# Patient Record
Sex: MALE | Race: WHITE | NOT HISPANIC OR LATINO | Employment: FULL TIME | URBAN - METROPOLITAN AREA
[De-identification: names, ages, dates, MRNs, and addresses within clinical notes are randomized per-mention and may not be internally consistent; named-entity substitution may affect disease eponyms.]

---

## 2018-11-12 ENCOUNTER — CONSULT (OUTPATIENT)
Dept: CARDIOLOGY | Facility: CLINIC | Age: 43
End: 2018-11-12

## 2018-11-12 VITALS
WEIGHT: 205 LBS | DIASTOLIC BLOOD PRESSURE: 80 MMHG | HEIGHT: 70 IN | HEART RATE: 62 BPM | SYSTOLIC BLOOD PRESSURE: 122 MMHG | BODY MASS INDEX: 29.35 KG/M2

## 2018-11-12 DIAGNOSIS — R94.31 EKG ABNORMALITY: ICD-10-CM

## 2018-11-12 DIAGNOSIS — Z82.49 FAMILY HISTORY OF CORONARY ARTERY DISEASE: ICD-10-CM

## 2018-11-12 DIAGNOSIS — R07.89 OTHER CHEST PAIN: ICD-10-CM

## 2018-11-12 DIAGNOSIS — R94.31 ABNORMAL EKG: Primary | ICD-10-CM

## 2018-11-12 PROBLEM — R07.9 CHEST PAIN: Status: ACTIVE | Noted: 2018-11-12

## 2018-11-12 PROCEDURE — 93000 ELECTROCARDIOGRAM COMPLETE: CPT | Performed by: PHYSICIAN ASSISTANT

## 2018-11-12 PROCEDURE — 99204 OFFICE O/P NEW MOD 45 MIN: CPT | Performed by: PHYSICIAN ASSISTANT

## 2018-11-12 RX ORDER — ATORVASTATIN CALCIUM 40 MG/1
40 TABLET, FILM COATED ORAL DAILY
COMMUNITY

## 2018-11-12 NOTE — PROGRESS NOTES
[facility] at [facility]  INITIAL OFFICE CONSULT      Isac Salcido  1975  PCP: Lai Cabrera MD    SUBJECTIVE:   Isac Salcido is a 43 y.o. male seen for a consultation visit regarding the following: Abnormal EKG, Chest pain    Chief Complaint:   Chief Complaint   Patient presents with   • Abnormal ECG          Consultation is requested by Lai Cabrera MD for evaluation of Abnormal ECG    Problem List:  1. Abnormal EKG   A)Remote stress test age 26 years ago, Negative per patient.   2. Palpitations/one a month  3. HLD-Statin  4. Tobacco (quit 18 years ago)  5. Multiple orthopedic  surgeries  6. Family Hx of CAD(Father MI 46)    History:  Today the patient was seen regarding abnormal EKG and history of chest pain at the request of his primary care provider Dr. Cisneros.  The patient is a pleasant 43-year-old [place] who denies any previous cardiac history.  He does have a history of dyslipidemia remote tobacco abuse and a family history of coronary artery disease.  He states he has stress test proximal in 25 years ago regarding chest pain syndrome and abnormal EKG.  Reports distress is acceptable.  Recently he reports that he occasionally has some chest pain but he trips this to sometimes he was wasting his musculoskeletal discomfort in left sinus chest that usually occurs after he finished working out resolves at rest.  He did present to his primary care provider's office reportedly had a discomfort EKGs obtained that was considered abnormal with ST-T wave flattening in the lateral leads.  In view of this finding is recommended he undergo cardiac evaluation.  Patient states she currently is not does not notice any exertional symptoms.  He denies any sustained palpitations.  He denies any dizziness, near-syncope or severe.  He denies any heart failure symptoms.  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      Cardiac PMH: (Old records have been reviewed and summarized below)        Past Medical History, Past Surgical History, Family history, Social History, and Medications were all reviewed with the patient today and updated as necessary.     Current Outpatient Medications   Medication Sig Dispense Refill   • atorvastatin (LIPITOR) 40 MG tablet Take 40 mg by mouth Daily.       No current facility-administered medications for this visit.      Allergies   Allergen Reactions   • Simvastatin Other (See Comments)     \"did not work\"         Past Medical History:   Diagnosis Date   • Abnormal EKG    • Hyperlipidemia      History reviewed. No pertinent surgical history.  Family History   Problem Relation Age of Onset   • Heart attack Father    • Heart disease Father      Social History     Tobacco Use   • Smoking status: Former Smoker     Types: Cigarettes     Last attempt to quit: 2001     Years since quittin.0   • Smokeless tobacco: Former User   Substance Use Topics   • Alcohol use: No       ROS:  Review of Symptoms:  General: no recent weight loss/gain, weakness or fatigue  Skin: no rashes, lumps, or other skin changes  HEENT: no dizziness, lightheadedness, or vision changes  Respiratory: no cough or hemoptysis  Cardiovascular: Rare palpitations, and tachycardia  Gastrointestinal: no black/tarry stools or diarrhea  Urinary: no change in frequency or urgency  Peripheral Vascular: no claudication or leg cramps  Musculoskeletal:  OA  Psychiatric: + history of stress.   Neurological: no sensory or motor loss, no syncope  Hematologic: no anemia, easy bruising or bleeding  Endocrine: no thyroid problems, nor heat or cold intolerance         PHYSICAL EXAM:   /80 (BP Location: Right arm, Patient Position: Sitting)   Pulse 62   Ht 177.8 cm (70\")   Wt 93 kg (205 lb)   BMI 29.41 kg/m²      Wt Readings from Last 5 Encounters:   18 93 kg (205 lb)     BP Readings from Last 5 Encounters:   18 " 122/80       General-Well Nourished, Well developed  Eyes - PERRLA  Neck- supple, No mass  CV- regular rate and rhythm, no MRG  Lung- clear bilaterally  Abd- soft, +BS  Musc/skel - Norm strength and range of motion  Skin- warm and dry  Neuro - Alert & Oriented x 3, appropriate mood.    Medical problems and test results were reviewed with the patient today.     No results found for this or any previous visit.      No results found for: CHOL, HDL, HDLC, LDL, LDLC, VLDL    EKG:  (EKG/Tracing has been independently visualized by me and summarized below)      ECG 12 Lead  Date/Time: 11/12/2018 12:52 PM  Performed by: Tahir Orozco PA  Authorized by: Tahir Orozco PA   Comparison: not compared with previous ECG   Rhythm: sinus rhythm and sinus bradycardia  Rate: normal  Conduction: conduction normal  ST Flattening: I, II, V5 and V6  T Waves: T waves normal  QRS axis: normal  Other: no other findings  Clinical impression: abnormal ECG          ASSESSMENT   1. Abnormal EKG with T wave abnormalities in lateral leads  2. Atypical chest pain  3. HLD: Statin, Goal of LDL <100    PLAN  · Echocardiogram  · GXT stress Echocardiogram  · Follow up pending above results with continued focus on risk factor modification.         Cardiology/Electrophysiology  11/12/18  12:51 PM  Will Ernesto CROW

## 2018-12-04 ENCOUNTER — HOSPITAL ENCOUNTER (OUTPATIENT)
Dept: CARDIOLOGY | Facility: HOSPITAL | Age: 43
Discharge: HOME | End: 2018-12-04
Admitting: PHYSICIAN ASSISTANT

## 2018-12-04 VITALS
WEIGHT: 205.03 LBS | HEIGHT: 70 IN | SYSTOLIC BLOOD PRESSURE: 110 MMHG | HEART RATE: 54 BPM | BODY MASS INDEX: 29.35 KG/M2 | DIASTOLIC BLOOD PRESSURE: 70 MMHG

## 2018-12-04 DIAGNOSIS — R94.31 ABNORMAL EKG: ICD-10-CM

## 2018-12-04 PROCEDURE — 93350 STRESS TTE ONLY: CPT

## 2018-12-04 PROCEDURE — 93017 CV STRESS TEST TRACING ONLY: CPT

## 2018-12-04 PROCEDURE — 93325 DOPPLER ECHO COLOR FLOW MAPG: CPT | Performed by: INTERNAL MEDICINE

## 2018-12-04 PROCEDURE — 93320 DOPPLER ECHO COMPLETE: CPT

## 2018-12-04 PROCEDURE — 25010000002 SULFUR HEXAFLUORIDE MICROSPH 60.7-25 MG RECONSTITUTED SUSPENSION: Performed by: PHYSICIAN ASSISTANT

## 2018-12-04 PROCEDURE — 93352 ADMIN ECG CONTRAST AGENT: CPT | Performed by: INTERNAL MEDICINE

## 2018-12-04 PROCEDURE — 93325 DOPPLER ECHO COLOR FLOW MAPG: CPT

## 2018-12-04 PROCEDURE — 93320 DOPPLER ECHO COMPLETE: CPT | Performed by: INTERNAL MEDICINE

## 2018-12-04 PROCEDURE — 93350 STRESS TTE ONLY: CPT | Performed by: INTERNAL MEDICINE

## 2018-12-04 PROCEDURE — 93018 CV STRESS TEST I&R ONLY: CPT | Performed by: INTERNAL MEDICINE

## 2018-12-04 RX ADMIN — SULFUR HEXAFLUORIDE 5 ML: KIT at 16:09

## 2018-12-07 LAB
BH CV ECHO MEAS - EF(MOD-SP2): 48 %
BH CV ECHO MEAS - EF(MOD-SP4): 57 %
BH CV ECHO MEAS - FS: 42 %
BH CV ECHO MEAS - IVS/LVPW: 1.1 CM
BH CV ECHO MEAS - IVSD: 1.1 CM
BH CV ECHO MEAS - LA DIMENSION: 3.5 CM
BH CV ECHO MEAS - LV MASS(C)D: 189.4 GRAMS
BH CV ECHO MEAS - LVIDD: 4.8 CM
BH CV ECHO MEAS - LVIDS: 2.7 CM
BH CV ECHO MEAS - LVPWD: 1 CM
BH CV STRESS BP STAGE 1: NORMAL
BH CV STRESS BP STAGE 2: NORMAL
BH CV STRESS BP STAGE 3: NORMAL
BH CV STRESS DURATION MIN STAGE 1: 3
BH CV STRESS DURATION MIN STAGE 2: 3
BH CV STRESS DURATION MIN STAGE 3: 3
BH CV STRESS DURATION MIN STAGE 4: 3
BH CV STRESS DURATION SEC STAGE 1: 0
BH CV STRESS DURATION SEC STAGE 2: 0
BH CV STRESS DURATION SEC STAGE 3: 0
BH CV STRESS DURATION SEC STAGE 4: 4
BH CV STRESS GRADE STAGE 1: 10
BH CV STRESS GRADE STAGE 2: 12
BH CV STRESS GRADE STAGE 3: 14
BH CV STRESS GRADE STAGE 4: 16
BH CV STRESS HR STAGE 1: 82
BH CV STRESS HR STAGE 2: 100
BH CV STRESS HR STAGE 3: 123
BH CV STRESS HR STAGE 4: 153
BH CV STRESS METS STAGE 1: 5
BH CV STRESS METS STAGE 2: 7.5
BH CV STRESS METS STAGE 3: 10
BH CV STRESS METS STAGE 4: 13.5
BH CV STRESS PROTOCOL 1: NORMAL
BH CV STRESS RECOVERY BP: NORMAL MMHG
BH CV STRESS RECOVERY HR: 85 BPM
BH CV STRESS SPEED STAGE 1: 1.7
BH CV STRESS SPEED STAGE 2: 2.5
BH CV STRESS SPEED STAGE 3: 3.4
BH CV STRESS SPEED STAGE 4: 4.2
BH CV STRESS STAGE 1: 1
BH CV STRESS STAGE 2: 2
BH CV STRESS STAGE 3: 3
BH CV STRESS STAGE 4: 4
LV EF 2D ECHO EST: 55 %
MAXIMAL PREDICTED HEART RATE: 178 BPM
PERCENT MAX PREDICTED HR: 85.96 %
STRESS BASELINE BP: NORMAL MMHG
STRESS BASELINE HR: 53 BPM
STRESS PERCENT HR: 101 %
STRESS POST ESTIMATED WORKLOAD: 13.7 METS
STRESS POST EXERCISE DUR MIN: 12 MIN
STRESS POST EXERCISE DUR SEC: 4 SEC
STRESS POST PEAK BP: NORMAL MMHG
STRESS POST PEAK HR: 153 BPM
STRESS TARGET HR: 151 BPM